# Patient Record
Sex: MALE | Race: WHITE | NOT HISPANIC OR LATINO | URBAN - NONMETROPOLITAN AREA
[De-identification: names, ages, dates, MRNs, and addresses within clinical notes are randomized per-mention and may not be internally consistent; named-entity substitution may affect disease eponyms.]

---

## 2021-06-22 ENCOUNTER — TRANSCRIBE ORDERS (OUTPATIENT)
Dept: GENERAL RADIOLOGY | Facility: CLINIC | Age: 71
End: 2021-06-22

## 2021-06-22 DIAGNOSIS — Z00.00 ROUTINE GENERAL MEDICAL EXAMINATION AT A HEALTH CARE FACILITY: Primary | ICD-10-CM

## 2021-07-14 ENCOUNTER — CLINICAL SUPPORT (OUTPATIENT)
Dept: AUDIOLOGY | Facility: CLINIC | Age: 71
End: 2021-07-14

## 2021-07-14 DIAGNOSIS — H90.3 SENSORINEURAL HEARING LOSS, BILATERAL: Primary | ICD-10-CM

## 2021-07-14 DIAGNOSIS — Z77.122 HISTORY OF EXPOSURE TO NOISE: ICD-10-CM

## 2021-07-14 PROCEDURE — 92557 COMPREHENSIVE HEARING TEST: CPT | Performed by: AUDIOLOGIST

## 2021-07-14 PROCEDURE — 92567 TYMPANOMETRY: CPT | Performed by: AUDIOLOGIST

## 2021-07-15 NOTE — PROGRESS NOTES
STANDARD AUDIOMETRIC EVALUATION      Name:  Oswaldo Sanchez  :  1950  Age:  70 y.o.  Date of Evaluation:  7/15/2021      HISTORY    Reason for visit:  Oswaldo Sanchez is seen today for a hearing test at the request of [facility] ([facility]).  Patient reports his most recent hearing test did not pass the criteria for hearing in his right ear, and he therefore needs further audiological testing.  He states he may have some problems hearing, but he does not have hearing aids.  He reports working 51 years in the [employer], and he wears hearing protection.        EVALUATION    See Audiogram    RESULTS        Otoscopy and Tympanometry 226 Hz :  Right Ear:  Otoscopy:  Clear ear canal          Tympanometry:  Middle ear function within normal limits    Left Ear:   Otoscopy:  Clear ear canal        Tympanometry:  Middle ear function within normal limits    Test technique:  Standard Audiometry     Pure Tone Audiometry:   Patient responded to pure tones at 30-60 dB for 250-8000 Hz in right ear, and at 30-65 dB for 250-8000 Hz in left ear.       Speech Audiometry:        Right Ear:  Speech Reception Threshold (SRT) was obtained at 30 dBHL                 Speech Discrimination scores were 88% in quiet when words were presented at 70 dBHL       Left Ear:  Speech Reception Threshold (SRT) was obtained at 35 dBHL                 Speech Discrimination scores were 88% in quiet when words were presented at 75 dBHL     Speech in Noise was tested in sound field at a +10 signal to noise ratio with speech at 70 dBHL and masking noise at 60 dBHL.  Speech Discrimination under this condition was 96%.    Reliability:   excellent    IMPRESSIONS:  1.  Tympanometry results are consistent with Middle ear function within normal limits in both ears.  2.  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       RECOMMENDATIONS:  Test results were reviewed with patient, and  all questions were answered at this time.  His [facility] paperwork was completed and forwarded to [facility] on his behalf.  It was a pleasure seeing Oswaldo Sanchez in Audiology today.  We would be happy to do further testing or discuss these test as necessary. My thanks to [facility] for this referral.           This document has been electronically signed by Rina Snyder MS CCC-A on July 15, 2021 14:36 CDT       Rina Snyder MS CCC-A  Licensed Audiologist